# Patient Record
Sex: MALE | Race: ASIAN | NOT HISPANIC OR LATINO | URBAN - METROPOLITAN AREA
[De-identification: names, ages, dates, MRNs, and addresses within clinical notes are randomized per-mention and may not be internally consistent; named-entity substitution may affect disease eponyms.]

---

## 2023-09-17 ENCOUNTER — EMERGENCY (EMERGENCY)
Facility: HOSPITAL | Age: 58
LOS: 1 days | Discharge: HOME | End: 2023-09-17
Attending: EMERGENCY MEDICINE | Admitting: EMERGENCY MEDICINE
Payer: MEDICARE

## 2023-09-17 VITALS
DIASTOLIC BLOOD PRESSURE: 84 MMHG | TEMPERATURE: 98 F | SYSTOLIC BLOOD PRESSURE: 122 MMHG | HEART RATE: 62 BPM | OXYGEN SATURATION: 98 % | RESPIRATION RATE: 18 BRPM

## 2023-09-17 PROCEDURE — 99283 EMERGENCY DEPT VISIT LOW MDM: CPT

## 2023-09-17 NOTE — ED PROVIDER NOTE - PRO INTERPRETER NEED 2
Please call pt its urgent she is still feeling lifeless and wanted to talk to you to see what she needs to do    Please call [phone]  
Returned pt's call and she stated that she has been feeling better today but has been having trouble sleeping at night. I encouraged pt to try otc melatonin to see if that helped her. Pt stated that she will try it and will call us back if it does not work for her  
Returned pt's call, no answer. Left message to call back  
English
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
## 2023-09-17 NOTE — ED PROVIDER NOTE - NSICDXPASTMEDICALHX_GEN_ALL_CORE_FT
PAST MEDICAL HISTORY:  DM (diabetes mellitus)     H/O spinal cord injury     HTN (hypertension)

## 2023-09-17 NOTE — ED PROVIDER NOTE - ATTENDING CONTRIBUTION TO CARE
patient with spinal cord injury presents with ulceration to buttock for more than 1 month. no evidence of infection.  needs wound care given patient is a paraplegic and sits on affected area.  dc with woundcare followup

## 2023-09-17 NOTE — ED PROVIDER NOTE - PHYSICAL EXAMINATION
Gen: NAD  HEENT: mucous membranes moist  CV: RRR, +S1/S2, no M/R/G, 2+ radial pulses b/l  Resp: CTAB, no W/R/R, no accessory muscle use, no increased work of breathing  GI: Abdomen soft non-distended, NTTP  Skin: multiple 1 to 2 cm nontender nondraining ulcerations without surrounding erythema or warmth present over left buttocks.   Neuro: following commands  Psych: appropriate mood

## 2023-09-17 NOTE — ED PROVIDER NOTE - PATIENT PORTAL LINK FT
You can access the FollowMyHealth Patient Portal offered by [facility] by registering at the following website: [email or web address]. By joining [employer]’s FollowMyHealth portal, you will also be able to view your health information using other applications (apps) compatible with our system.

## 2023-09-17 NOTE — ED PROVIDER NOTE - NSFOLLOWUPINSTRUCTIONS_ED_ALL_ED_FT
We have put in for our referral team to contact you to help set up an appointment with our wound care team. Please return to the ED if you have any worsening symptoms.     Preventing Pressure Injuries  A person lying on a bed. Areas of the body that are prone to pressure injuries are marked.  A pressure injury, also called a pressure ulcer or bedsore, is an injury to the skin and the tissue under the skin that is caused by pressure. It can happen when your skin presses against a surface, such as a mattress or the seat of a wheelchair, for too long. The pressure on the blood vessels causes reduced blood flow to your skin. Over time, this can make the tissue die and break down, causing a wound.    Pressure injuries often occur:  Over bony parts of the body, such as the tailbone, shoulders, elbows, hips, heels, spine, ankles, and back of the head.  Under medical devices, such as stockings, equipment to help with breathing, tubes, and splints.  Inside the mouth or nose from dentures or tubes.  How can pressure injuries affect me?  Pressure injuries are caused by a lack of blood supply to an area of the skin. These injuries begin as a red or dark area on the skin and can become an open sore. They can come from a lot of pressure on the skin over a short period of time or from less pressure over a long period of time.    Pressure injuries may be mild, moderate, or severe. They can cause pain, damage to your muscles, and infection.    What can increase my risk for pressure injuries?  You are more likely to develop this condition if:  You are in the hospital or an extended care facility.  You are bedridden or in a wheelchair.  You have an injury or disease that keeps you from:  Moving like normal.  Feeling pain or pressure.  Telling someone if you feel pain or pressure.  You have a condition that:  Makes you sleepy or less alert.  Causes poor blood flow.  You need to wear a medical device.  You have poor control of your bladder or bowel movements (incontinence).  You are not getting enough fluid or nutrients (malnutrition).  You have had this condition before.  What actions can I take to prevent pressure injuries?  Reducing pressure    Do not lie or sit in one position for a long time. Move or change position as often as told by your health care provider. You may need to move:  Every hour when out of bed in a chair.  Every 2 hours when in bed.  Use pillows or cushions to reduce pressure on certain areas of the body. Ask your health care provider to recommend a mattress, mattress cover, cushions, pads, or heel protectors. These may include products filled with air, foam, gel, or sand.  Use medical devices that do not rub your skin. Tell your health care provider if one of your medical devices is causing pain or irritation.  Skin care in the hospital    If you are in the hospital, your health care providers will help you care for your skin. They may:  Inspect your skin at least twice a day. This includes the areas under or around medical devices.  Assess your nutrition and consult a dietitian if needed.  Perform regular wound care.  Help you move into a different position every few hours and adjust any medical devices.  Keep your skin clean and dry.  Use gentle cleansers and skin protectants if you are incontinent.  Moisturize your dry skin.  Skin care at home    A person applying skin lotion moisturizer to the hands.  Keep your skin clean and dry. Gently pat your skin dry.  Do not rub or massage skin that is on bony areas.  Moisturize dry skin.  Use foams, creams, or powders to protect your skin from sweat, urine, and stool and reduce rubbing (friction) on the skin.  Check your skin every day for any changes in color or any new blisters or sores. Check under and around any medical devices and between skinfolds. Have a caregiver do this for you if you are not able.  Nutrition    Foods that are high in protein, including nuts, peanut butter, cheese, chicken, fish, beans, yogurt, and milk.  Drink enough fluid to keep your urine pale yellow.  Eat a healthy diet that includes protein, vitamins, and minerals. Ask your health care provider what types of food you should eat.  Lifestyle    Do not use drugs or drink alcohol.  Do not use any products that contain nicotine or tobacco. These products include cigarettes, chewing tobacco, and vaping devices, such as e-cigarettes. If you need help quitting, ask your health care provider.  Try to be active every day. Ask your health care provider what exercises or activities are safe for you.  General instructions    Take over-the-counter and prescription medicines only as told by your health care provider.  Keep all follow-up visits. Work with your health care provider to manage any long-term (chronic) conditions.  Where to find more information  The National Pressure Injury Advisory Panel (NPIAP): npiap.com  Contact a health care provider if:  You feel or see any changes to your skin.

## 2023-09-17 NOTE — ED ADULT TRIAGE NOTE - CHIEF COMPLAINT QUOTE
pt  states he was sent in for wound that he states has been there x one month not heeling over right buttocks area.  Pt is wheel chair bound can not walk. Pt also co pain to Left knee and leg has a leg brace on . fs 268. Pt with HX of spine Injury .